# Patient Record
Sex: FEMALE | Race: WHITE | HISPANIC OR LATINO | URBAN - METROPOLITAN AREA
[De-identification: names, ages, dates, MRNs, and addresses within clinical notes are randomized per-mention and may not be internally consistent; named-entity substitution may affect disease eponyms.]

---

## 2017-08-28 ENCOUNTER — INPATIENT (INPATIENT)
Facility: HOSPITAL | Age: 22
LOS: 1 days | Discharge: HOME | DRG: 872 | End: 2017-08-30
Attending: INTERNAL MEDICINE | Admitting: INTERNAL MEDICINE
Payer: COMMERCIAL

## 2017-08-28 VITALS
RESPIRATION RATE: 18 BRPM | HEART RATE: 122 BPM | SYSTOLIC BLOOD PRESSURE: 145 MMHG | DIASTOLIC BLOOD PRESSURE: 69 MMHG | TEMPERATURE: 98 F | OXYGEN SATURATION: 97 %

## 2017-08-28 LAB
ALBUMIN SERPL ELPH-MCNC: 3.1 G/DL — LOW (ref 3.4–5)
ALP SERPL-CCNC: 33 U/L — LOW (ref 40–120)
ALT FLD-CCNC: 29 U/L — SIGNIFICANT CHANGE UP (ref 12–42)
ANION GAP SERPL CALC-SCNC: 10 MMOL/L — SIGNIFICANT CHANGE UP (ref 9–16)
APPEARANCE UR: CLEAR — SIGNIFICANT CHANGE UP
AST SERPL-CCNC: 22 U/L — SIGNIFICANT CHANGE UP (ref 15–37)
BASOPHILS NFR BLD AUTO: 1 % — SIGNIFICANT CHANGE UP (ref 0–2)
BILIRUB SERPL-MCNC: 0.5 MG/DL — SIGNIFICANT CHANGE UP (ref 0.2–1.2)
BILIRUB UR-MCNC: NEGATIVE — SIGNIFICANT CHANGE UP
BUN SERPL-MCNC: 8 MG/DL — SIGNIFICANT CHANGE UP (ref 7–23)
CALCIUM SERPL-MCNC: 8.4 MG/DL — LOW (ref 8.5–10.5)
CHLORIDE SERPL-SCNC: 103 MMOL/L — SIGNIFICANT CHANGE UP (ref 96–108)
CO2 SERPL-SCNC: 23 MMOL/L — SIGNIFICANT CHANGE UP (ref 22–31)
COLOR SPEC: YELLOW — SIGNIFICANT CHANGE UP
CREAT SERPL-MCNC: 0.7 MG/DL — SIGNIFICANT CHANGE UP (ref 0.5–1.3)
DIFF PNL FLD: (no result)
EOSINOPHIL NFR BLD AUTO: 1 % — SIGNIFICANT CHANGE UP (ref 0–6)
GLUCOSE SERPL-MCNC: 107 MG/DL — HIGH (ref 70–99)
GLUCOSE UR QL: NEGATIVE — SIGNIFICANT CHANGE UP
HCG UR QL: NEGATIVE — SIGNIFICANT CHANGE UP
HCT VFR BLD CALC: 36.3 % — SIGNIFICANT CHANGE UP (ref 34.5–45)
HGB BLD-MCNC: 12.1 G/DL — SIGNIFICANT CHANGE UP (ref 11.5–15.5)
KETONES UR-MCNC: NEGATIVE — SIGNIFICANT CHANGE UP
LACTATE SERPL-SCNC: 0.5 MMOL/L — SIGNIFICANT CHANGE UP (ref 0.4–2)
LEUKOCYTE ESTERASE UR-ACNC: NEGATIVE — SIGNIFICANT CHANGE UP
LIDOCAIN IGE QN: 50 U/L — LOW (ref 73–393)
LYMPHOCYTES # BLD AUTO: 23 % — SIGNIFICANT CHANGE UP (ref 13–44)
MCHC RBC-ENTMCNC: 29.6 PG — SIGNIFICANT CHANGE UP (ref 27–34)
MCHC RBC-ENTMCNC: 33.3 G/DL — SIGNIFICANT CHANGE UP (ref 32–36)
MCV RBC AUTO: 88.8 FL — SIGNIFICANT CHANGE UP (ref 80–100)
MONOCYTES NFR BLD AUTO: 10 % — SIGNIFICANT CHANGE UP (ref 2–14)
NEUTROPHILS NFR BLD AUTO: 42 % — LOW (ref 43–77)
NITRITE UR-MCNC: NEGATIVE — SIGNIFICANT CHANGE UP
PH UR: 6.5 — SIGNIFICANT CHANGE UP (ref 5–8)
PLATELET # BLD AUTO: 146 K/UL — LOW (ref 150–400)
POTASSIUM SERPL-MCNC: 3.5 MMOL/L — SIGNIFICANT CHANGE UP (ref 3.5–5.3)
POTASSIUM SERPL-SCNC: 3.5 MMOL/L — SIGNIFICANT CHANGE UP (ref 3.5–5.3)
PROT SERPL-MCNC: 6.4 G/DL — SIGNIFICANT CHANGE UP (ref 6.4–8.2)
PROT UR-MCNC: NEGATIVE MG/DL — SIGNIFICANT CHANGE UP
RBC # BLD: 4.09 M/UL — SIGNIFICANT CHANGE UP (ref 3.8–5.2)
RBC # FLD: 12.3 % — SIGNIFICANT CHANGE UP (ref 10.3–16.9)
SODIUM SERPL-SCNC: 136 MMOL/L — SIGNIFICANT CHANGE UP (ref 132–145)
SP GR SPEC: <=1.005 — SIGNIFICANT CHANGE UP (ref 1–1.03)
UROBILINOGEN FLD QL: 0.2 E.U./DL — SIGNIFICANT CHANGE UP
WBC # BLD: 3.8 K/UL — SIGNIFICANT CHANGE UP (ref 3.8–10.5)
WBC # FLD AUTO: 3.8 K/UL — SIGNIFICANT CHANGE UP (ref 3.8–10.5)

## 2017-08-28 PROCEDURE — 74177 CT ABD & PELVIS W/CONTRAST: CPT | Mod: 26

## 2017-08-28 PROCEDURE — 99218: CPT

## 2017-08-28 RX ORDER — MORPHINE SULFATE 50 MG/1
4 CAPSULE, EXTENDED RELEASE ORAL ONCE
Qty: 0 | Refills: 0 | Status: DISCONTINUED | OUTPATIENT
Start: 2017-08-28 | End: 2017-08-28

## 2017-08-28 RX ORDER — KETOROLAC TROMETHAMINE 30 MG/ML
30 SYRINGE (ML) INJECTION ONCE
Qty: 0 | Refills: 0 | Status: DISCONTINUED | OUTPATIENT
Start: 2017-08-28 | End: 2017-08-28

## 2017-08-28 RX ORDER — SODIUM CHLORIDE 9 MG/ML
1000 INJECTION INTRAMUSCULAR; INTRAVENOUS; SUBCUTANEOUS ONCE
Qty: 0 | Refills: 0 | Status: COMPLETED | OUTPATIENT
Start: 2017-08-28 | End: 2017-08-28

## 2017-08-28 RX ORDER — IOHEXOL 300 MG/ML
50 INJECTION, SOLUTION INTRAVENOUS ONCE
Qty: 0 | Refills: 0 | Status: COMPLETED | OUTPATIENT
Start: 2017-08-28 | End: 2017-08-28

## 2017-08-28 RX ORDER — ONDANSETRON 8 MG/1
4 TABLET, FILM COATED ORAL ONCE
Qty: 0 | Refills: 0 | Status: COMPLETED | OUTPATIENT
Start: 2017-08-28 | End: 2017-08-28

## 2017-08-28 RX ORDER — MORPHINE SULFATE 50 MG/1
2 CAPSULE, EXTENDED RELEASE ORAL EVERY 4 HOURS
Qty: 0 | Refills: 0 | Status: DISCONTINUED | OUTPATIENT
Start: 2017-08-28 | End: 2017-08-29

## 2017-08-28 RX ORDER — PIPERACILLIN AND TAZOBACTAM 4; .5 G/20ML; G/20ML
4.5 INJECTION, POWDER, LYOPHILIZED, FOR SOLUTION INTRAVENOUS ONCE
Qty: 0 | Refills: 0 | Status: COMPLETED | OUTPATIENT
Start: 2017-08-28 | End: 2017-08-28

## 2017-08-28 RX ORDER — SODIUM CHLORIDE 9 MG/ML
1000 INJECTION INTRAMUSCULAR; INTRAVENOUS; SUBCUTANEOUS
Qty: 0 | Refills: 0 | Status: DISCONTINUED | OUTPATIENT
Start: 2017-08-28 | End: 2017-08-29

## 2017-08-28 RX ADMIN — MORPHINE SULFATE 4 MILLIGRAM(S): 50 CAPSULE, EXTENDED RELEASE ORAL at 19:49

## 2017-08-28 RX ADMIN — ONDANSETRON 4 MILLIGRAM(S): 8 TABLET, FILM COATED ORAL at 18:03

## 2017-08-28 RX ADMIN — MORPHINE SULFATE 4 MILLIGRAM(S): 50 CAPSULE, EXTENDED RELEASE ORAL at 20:17

## 2017-08-28 RX ADMIN — Medication 30 MILLIGRAM(S): at 21:14

## 2017-08-28 RX ADMIN — SODIUM CHLORIDE 1000 MILLILITER(S): 9 INJECTION INTRAMUSCULAR; INTRAVENOUS; SUBCUTANEOUS at 17:00

## 2017-08-28 RX ADMIN — Medication 30 MILLIGRAM(S): at 21:47

## 2017-08-28 RX ADMIN — MORPHINE SULFATE 2 MILLIGRAM(S): 50 CAPSULE, EXTENDED RELEASE ORAL at 18:01

## 2017-08-28 RX ADMIN — MORPHINE SULFATE 4 MILLIGRAM(S): 50 CAPSULE, EXTENDED RELEASE ORAL at 23:08

## 2017-08-28 RX ADMIN — MORPHINE SULFATE 2 MILLIGRAM(S): 50 CAPSULE, EXTENDED RELEASE ORAL at 18:51

## 2017-08-28 RX ADMIN — MORPHINE SULFATE 4 MILLIGRAM(S): 50 CAPSULE, EXTENDED RELEASE ORAL at 23:23

## 2017-08-28 RX ADMIN — IOHEXOL 50 MILLILITER(S): 300 INJECTION, SOLUTION INTRAVENOUS at 18:01

## 2017-08-28 RX ADMIN — ONDANSETRON 4 MILLIGRAM(S): 8 TABLET, FILM COATED ORAL at 21:14

## 2017-08-28 RX ADMIN — SODIUM CHLORIDE 200 MILLILITER(S): 9 INJECTION INTRAMUSCULAR; INTRAVENOUS; SUBCUTANEOUS at 21:19

## 2017-08-28 RX ADMIN — PIPERACILLIN AND TAZOBACTAM 200 GRAM(S): 4; .5 INJECTION, POWDER, LYOPHILIZED, FOR SOLUTION INTRAVENOUS at 21:14

## 2017-08-28 NOTE — ED CDU PROVIDER NOTE - PROGRESS NOTE DETAILS
patient with continued abdominal pain, minimally able to tolerate POs. given bands, and pancolitis, will admit. patient amenable to admission. spoke to debra and hospitalist, accepting admission, pending a lactic. blood cultures drawn and to be sent.

## 2017-08-28 NOTE — ED PROVIDER NOTE - MEDICAL DECISION MAKING DETAILS
Hydrate w/ IV fluids. 2mg of morphine for pain. Labs, urine, CT abd/pelvis. rule out colitis, diverticulitis.

## 2017-08-28 NOTE — ED PROVIDER NOTE - PROGRESS NOTE DETAILS
patient with continued abdominal pain, minimally able to tolerate POs. given bands, and pancolitis, will admit. patient amenable to admission.

## 2017-08-28 NOTE — ED PROVIDER NOTE - GASTROINTESTINAL, MLM
abdomen soft, diffuse tenderness to b/l lower quadrants, no rebound, no guarding, no CVA tenderness.

## 2017-08-28 NOTE — ED CDU PROVIDER NOTE - OBJECTIVE STATEMENT
22 yo F w/ no pertinent PMHx presents with 3 days of watery diarrhea with diffuse lower abdominal cramping waxing/waning; unable to tolerate PO with pain and diarrhea. Pt notes last PO was a banana this morning with N/V. LMP 1 week ago. no dysuria, fever, chills, chest pain, SOB, flank pain. no recent antibiotics denies similar symptoms. pt notes gluten intolerance.

## 2017-08-28 NOTE — ED CDU PROVIDER NOTE - MEDICAL DECISION MAKING DETAILS
Hydrate w/ IV fluids. 2mg of morphine for pain. Labs, urine, CT abd/pelvis. rule out colitis, diverticulitis. admission likely if no clinical improvement.

## 2017-08-28 NOTE — ED ADULT TRIAGE NOTE - CHIEF COMPLAINT QUOTE
Pt since Friday has had abdominal and diarrhea. Pt unable to keep anything in immediately makes BM, every hour. Cold sweats and fevers. Tearful in triage.

## 2017-08-28 NOTE — ED PROVIDER NOTE - OBJECTIVE STATEMENT
22 yo F w/ no pertinent PMHx presents with 3 days of watery diarrhea with diffuse lower abdominal cramping waxing/waning; unable to tolerate PO with pain sandra last po bana nv lmp 1 wk ago no dys fc cp sob flank norefent abx denies sim gluten intolerant 22 yo F w/ no pertinent PMHx presents with 3 days of watery diarrhea with diffuse lower abdominal cramping waxing/waning; unable to tolerate PO with pain and diarrhea. Pt notes last PO was a banana this morning with N/V. LMP 1 week ago. no dysuria, fever, chills, chest pain, SOB, flank pain. no recent antibiotics denies similar symptoms. pt notes gluten intolerance.

## 2017-08-28 NOTE — ED PROVIDER NOTE - CARE PLAN
Principal Discharge DX:	Pancolitis Principal Discharge DX:	Pancolitis  Secondary Diagnosis:	Bandemia

## 2017-08-29 DIAGNOSIS — E66.9 OBESITY, UNSPECIFIED: ICD-10-CM

## 2017-08-29 DIAGNOSIS — Z90.89 ACQUIRED ABSENCE OF OTHER ORGANS: Chronic | ICD-10-CM

## 2017-08-29 DIAGNOSIS — A41.9 SEPSIS, UNSPECIFIED ORGANISM: ICD-10-CM

## 2017-08-29 DIAGNOSIS — K90.0 CELIAC DISEASE: ICD-10-CM

## 2017-08-29 DIAGNOSIS — Z29.9 ENCOUNTER FOR PROPHYLACTIC MEASURES, UNSPECIFIED: ICD-10-CM

## 2017-08-29 DIAGNOSIS — K51.00 ULCERATIVE (CHRONIC) PANCOLITIS WITHOUT COMPLICATIONS: ICD-10-CM

## 2017-08-29 LAB
ANION GAP SERPL CALC-SCNC: 10 MMOL/L — SIGNIFICANT CHANGE UP (ref 5–17)
BUN SERPL-MCNC: 7 MG/DL — SIGNIFICANT CHANGE UP (ref 7–23)
CALCIUM SERPL-MCNC: 8.1 MG/DL — LOW (ref 8.4–10.5)
CHLORIDE SERPL-SCNC: 107 MMOL/L — SIGNIFICANT CHANGE UP (ref 96–108)
CO2 SERPL-SCNC: 21 MMOL/L — LOW (ref 22–31)
CREAT SERPL-MCNC: 0.7 MG/DL — SIGNIFICANT CHANGE UP (ref 0.5–1.3)
GLUCOSE SERPL-MCNC: 79 MG/DL — SIGNIFICANT CHANGE UP (ref 70–99)
HCT VFR BLD CALC: 32.3 % — LOW (ref 34.5–45)
HGB BLD-MCNC: 10.6 G/DL — LOW (ref 11.5–15.5)
MAGNESIUM SERPL-MCNC: 2.1 MG/DL — SIGNIFICANT CHANGE UP (ref 1.6–2.6)
MCHC RBC-ENTMCNC: 29.3 PG — SIGNIFICANT CHANGE UP (ref 27–34)
MCHC RBC-ENTMCNC: 32.8 G/DL — SIGNIFICANT CHANGE UP (ref 32–36)
MCV RBC AUTO: 89.2 FL — SIGNIFICANT CHANGE UP (ref 80–100)
PHOSPHATE SERPL-MCNC: 3.4 MG/DL — SIGNIFICANT CHANGE UP (ref 2.5–4.5)
PLATELET # BLD AUTO: 124 K/UL — LOW (ref 150–400)
POTASSIUM SERPL-MCNC: 3.4 MMOL/L — LOW (ref 3.5–5.3)
POTASSIUM SERPL-SCNC: 3.4 MMOL/L — LOW (ref 3.5–5.3)
RBC # BLD: 3.62 M/UL — LOW (ref 3.8–5.2)
RBC # FLD: 12.6 % — SIGNIFICANT CHANGE UP (ref 10.3–16.9)
SODIUM SERPL-SCNC: 138 MMOL/L — SIGNIFICANT CHANGE UP (ref 135–145)
WBC # BLD: 3.1 K/UL — LOW (ref 3.8–10.5)
WBC # FLD AUTO: 3.1 K/UL — LOW (ref 3.8–10.5)

## 2017-08-29 PROCEDURE — 12345: CPT | Mod: NC

## 2017-08-29 PROCEDURE — 99223 1ST HOSP IP/OBS HIGH 75: CPT | Mod: GC

## 2017-08-29 RX ORDER — POTASSIUM CHLORIDE 20 MEQ
10 PACKET (EA) ORAL
Qty: 0 | Refills: 0 | Status: COMPLETED | OUTPATIENT
Start: 2017-08-29 | End: 2017-08-29

## 2017-08-29 RX ORDER — SODIUM CHLORIDE 9 MG/ML
1000 INJECTION INTRAMUSCULAR; INTRAVENOUS; SUBCUTANEOUS
Qty: 0 | Refills: 0 | Status: DISCONTINUED | OUTPATIENT
Start: 2017-08-29 | End: 2017-08-30

## 2017-08-29 RX ORDER — ACETAMINOPHEN 500 MG
650 TABLET ORAL EVERY 6 HOURS
Qty: 0 | Refills: 0 | Status: DISCONTINUED | OUTPATIENT
Start: 2017-08-29 | End: 2017-08-29

## 2017-08-29 RX ORDER — POTASSIUM CHLORIDE 20 MEQ
10 PACKET (EA) ORAL
Qty: 0 | Refills: 0 | Status: DISCONTINUED | OUTPATIENT
Start: 2017-08-29 | End: 2017-08-29

## 2017-08-29 RX ORDER — CIPROFLOXACIN LACTATE 400MG/40ML
400 VIAL (ML) INTRAVENOUS EVERY 12 HOURS
Qty: 0 | Refills: 0 | Status: DISCONTINUED | OUTPATIENT
Start: 2017-08-29 | End: 2017-08-30

## 2017-08-29 RX ORDER — ONDANSETRON 8 MG/1
4 TABLET, FILM COATED ORAL EVERY 6 HOURS
Qty: 0 | Refills: 0 | Status: DISCONTINUED | OUTPATIENT
Start: 2017-08-29 | End: 2017-08-30

## 2017-08-29 RX ORDER — MORPHINE SULFATE 50 MG/1
2 CAPSULE, EXTENDED RELEASE ORAL EVERY 4 HOURS
Qty: 0 | Refills: 0 | Status: DISCONTINUED | OUTPATIENT
Start: 2017-08-29 | End: 2017-08-30

## 2017-08-29 RX ORDER — POTASSIUM CHLORIDE 20 MEQ
20 PACKET (EA) ORAL ONCE
Qty: 0 | Refills: 0 | Status: DISCONTINUED | OUTPATIENT
Start: 2017-08-29 | End: 2017-08-29

## 2017-08-29 RX ORDER — MORPHINE SULFATE 50 MG/1
1 CAPSULE, EXTENDED RELEASE ORAL EVERY 4 HOURS
Qty: 0 | Refills: 0 | Status: DISCONTINUED | OUTPATIENT
Start: 2017-08-29 | End: 2017-08-29

## 2017-08-29 RX ORDER — POTASSIUM CHLORIDE 20 MEQ
40 PACKET (EA) ORAL ONCE
Qty: 0 | Refills: 0 | Status: COMPLETED | OUTPATIENT
Start: 2017-08-29 | End: 2017-08-29

## 2017-08-29 RX ORDER — MORPHINE SULFATE 50 MG/1
2 CAPSULE, EXTENDED RELEASE ORAL EVERY 6 HOURS
Qty: 0 | Refills: 0 | Status: DISCONTINUED | OUTPATIENT
Start: 2017-08-29 | End: 2017-08-29

## 2017-08-29 RX ORDER — METRONIDAZOLE 500 MG
500 TABLET ORAL EVERY 8 HOURS
Qty: 0 | Refills: 0 | Status: DISCONTINUED | OUTPATIENT
Start: 2017-08-29 | End: 2017-08-30

## 2017-08-29 RX ORDER — DIPHENHYDRAMINE HCL 50 MG
25 CAPSULE ORAL ONCE
Qty: 0 | Refills: 0 | Status: COMPLETED | OUTPATIENT
Start: 2017-08-29 | End: 2017-08-29

## 2017-08-29 RX ADMIN — MORPHINE SULFATE 2 MILLIGRAM(S): 50 CAPSULE, EXTENDED RELEASE ORAL at 17:37

## 2017-08-29 RX ADMIN — Medication 100 MILLIEQUIVALENT(S): at 17:27

## 2017-08-29 RX ADMIN — MORPHINE SULFATE 2 MILLIGRAM(S): 50 CAPSULE, EXTENDED RELEASE ORAL at 03:51

## 2017-08-29 RX ADMIN — Medication 100 MILLIEQUIVALENT(S): at 15:55

## 2017-08-29 RX ADMIN — SODIUM CHLORIDE 120 MILLILITER(S): 9 INJECTION INTRAMUSCULAR; INTRAVENOUS; SUBCUTANEOUS at 10:30

## 2017-08-29 RX ADMIN — MORPHINE SULFATE 1 MILLIGRAM(S): 50 CAPSULE, EXTENDED RELEASE ORAL at 13:21

## 2017-08-29 RX ADMIN — ONDANSETRON 4 MILLIGRAM(S): 8 TABLET, FILM COATED ORAL at 12:16

## 2017-08-29 RX ADMIN — Medication 650 MILLIGRAM(S): at 10:29

## 2017-08-29 RX ADMIN — MORPHINE SULFATE 2 MILLIGRAM(S): 50 CAPSULE, EXTENDED RELEASE ORAL at 22:04

## 2017-08-29 RX ADMIN — MORPHINE SULFATE 2 MILLIGRAM(S): 50 CAPSULE, EXTENDED RELEASE ORAL at 04:06

## 2017-08-29 RX ADMIN — Medication 200 MILLIGRAM(S): at 22:04

## 2017-08-29 RX ADMIN — MORPHINE SULFATE 2 MILLIGRAM(S): 50 CAPSULE, EXTENDED RELEASE ORAL at 17:22

## 2017-08-29 RX ADMIN — SODIUM CHLORIDE 120 MILLILITER(S): 9 INJECTION INTRAMUSCULAR; INTRAVENOUS; SUBCUTANEOUS at 02:16

## 2017-08-29 RX ADMIN — Medication 100 MILLIEQUIVALENT(S): at 14:50

## 2017-08-29 RX ADMIN — Medication 25 MILLIGRAM(S): at 02:26

## 2017-08-29 RX ADMIN — MORPHINE SULFATE 1 MILLIGRAM(S): 50 CAPSULE, EXTENDED RELEASE ORAL at 13:09

## 2017-08-29 RX ADMIN — Medication 650 MILLIGRAM(S): at 11:29

## 2017-08-29 RX ADMIN — MORPHINE SULFATE 2 MILLIGRAM(S): 50 CAPSULE, EXTENDED RELEASE ORAL at 22:19

## 2017-08-29 RX ADMIN — Medication 100 MILLIGRAM(S): at 18:56

## 2017-08-29 RX ADMIN — Medication 200 MILLIGRAM(S): at 10:35

## 2017-08-29 RX ADMIN — Medication 100 MILLIGRAM(S): at 11:57

## 2017-08-29 NOTE — DISCHARGE NOTE ADULT - CARE PLAN
Principal Discharge DX:	Pancolitis  Goal:	Mitigate nausea, abdominal pain and diarrhea  Instructions for follow-up, activity and diet:	You were seen and evaluated for diarrhea, abdominal pain and nausea. You were tested for infectious causes of your diarrhea, and we have low suspicion that you require any kind of antibiotic upon leaving the hospital. Although the cause of your diarrhea is not completely clear, it could have been due to a viral gastroenteritis, which is typically self-resolving. We did find colon inflammation on your CT scan (pancolitis), and given this finding and your symptoms, it is important that you follow up with an outpatient gastroenterologist.    If your symptoms worsen considerably after leaving the hospital, please call your doctor, or if urgent return to the hospital.  Secondary Diagnosis:	Gluten intolerance  Instructions for follow-up, activity and diet:	You have a reported history of gluten intolerance. Continue to avoid gluten at this time, but please discuss this with the gastroenterologist at your follow up appointment to insure a correct diagnosis.  Secondary Diagnosis:	Lactose intolerance in adult  Instructions for follow-up, activity and diet:	As above, please discuss with the gastroenterologist at follow up. Principal Discharge DX:	Pancolitis  Goal:	Mitigate nausea, abdominal pain and diarrhea  Instructions for follow-up, activity and diet:	You were seen and evaluated for diarrhea, abdominal pain and nausea. You were tested for infectious causes of your diarrhea, and we have low suspicion that you require any kind of antibiotic upon leaving the hospital. Although the cause of your diarrhea is not completely clear, it could have been due to a viral gastroenteritis, which is typically self-resolving. We did find colon inflammation on your CT scan (pancolitis), and given this finding and your symptoms, it is important that you follow up. Your appointment is on September 6 at 11:30 with Sandra Cartagena NP, at [address] [place] [address], [address] Floor.    If your symptoms worsen considerably after leaving the hospital, please call your doctor, or if urgent return to the hospital.  Secondary Diagnosis:	Gluten intolerance  Instructions for follow-up, activity and diet:	You have a reported history of gluten intolerance. Continue to avoid gluten at this time, but please discuss this with the gastroenterologist at your follow up appointment to insure a correct diagnosis.  Secondary Diagnosis:	Lactose intolerance in adult  Instructions for follow-up, activity and diet:	As above, please discuss with the gastroenterologist at follow up. Principal Discharge DX:	Pancolitis  Goal:	Mitigate nausea, abdominal pain and diarrhea  Instructions for follow-up, activity and diet:	You were seen and evaluated for diarrhea, abdominal pain and nausea. You were tested for infectious causes of your diarrhea, and we have low suspicion that you require any kind of antibiotic upon leaving the hospital. Although the cause of your diarrhea is not completely clear, it could have been due to a viral gastroenteritis, which is typically self-resolving. We did find colon inflammation on your CT scan (pancolitis), and given this finding and your symptoms, it is important that you follow up. Your appointment is on September 6 at 11:30 with Sandra Cartagena NP, at [address] [place] [address], [address].    If your symptoms worsen considerably after leaving the hospital, please call your doctor, or if urgent return to the hospital.  Secondary Diagnosis:	Gluten intolerance  Instructions for follow-up, activity and diet:	You have a reported history of gluten intolerance. Continue to avoid gluten at this time, but please discuss this with the gastroenterologist at your follow up appointment to insure a correct diagnosis.  Secondary Diagnosis:	Lactose intolerance in adult  Instructions for follow-up, activity and diet:	As above, please discuss with the gastroenterologist at follow up. Principal Discharge DX:	Pancolitis  Goal:	Mitigate nausea, abdominal pain and diarrhea  Instructions for follow-up, activity and diet:	You were seen and evaluated for diarrhea, abdominal pain and nausea. You were tested for infectious causes of your diarrhea, and we have low suspicion that you require any kind of antibiotic upon leaving the hospital. Although the cause of your diarrhea is not completely clear, it could have been due to a viral gastroenteritis, which is typically self-resolving. We did find colon inflammation on your CT scan (pancolitis), and given this finding and your symptoms, it is important that you follow up. Your appointment is on September 6 at 11:30 with Sandra Cartagena NP, at [address] [place] [address], 4[address].    If your symptoms worsen considerably after leaving the hospital, please call your doctor, or if urgent return to the hospital.  Secondary Diagnosis:	Gluten intolerance  Instructions for follow-up, activity and diet:	You have a reported history of gluten intolerance. Continue to avoid gluten at this time, but please discuss this with the gastroenterologist at your follow up appointment to insure a correct diagnosis.  Secondary Diagnosis:	Lactose intolerance in adult  Instructions for follow-up, activity and diet:	As above, please discuss with the gastroenterologist at follow up. Principal Discharge DX:	Pancolitis  Goal:	Mitigate nausea, abdominal pain and diarrhea  Instructions for follow-up, activity and diet:	You were seen and evaluated for diarrhea, abdominal pain and nausea. You were tested for infectious causes of your diarrhea, and we have low suspicion that you require any kind of antibiotic upon leaving the hospital. Although the cause of your diarrhea is not completely clear, it could have been due to a viral gastroenteritis, which is typically self-resolving. We did find colon inflammation on your CT scan (pancolitis), and given this finding and your symptoms, it is important that you follow up. Your appointment is on September 6 at 11:30 with Sandra Cartagena NP, at [address] [place] [address], [address].    If your symptoms worsen considerably after leaving the hospital, please call your doctor, or if urgent return to the hospital.  Secondary Diagnosis:	Gluten intolerance  Instructions for follow-up, activity and diet:	You have a reported history of gluten intolerance. Continue to avoid gluten at this time, but please discuss this with the gastroenterologist at your follow up appointment to insure a correct diagnosis.  Secondary Diagnosis:	Lactose intolerance in adult  Instructions for follow-up, activity and diet:	As above, please discuss with the gastroenterologist at follow up. Principal Discharge DX:	Pancolitis  Goal:	Mitigate nausea, abdominal pain and diarrhea  Instructions for follow-up, activity and diet:	You were seen and evaluated for diarrhea, abdominal pain and nausea. You were tested for infectious causes of your diarrhea, and we have low suspicion that you require any kind of antibiotic upon leaving the hospital. Although the cause of your diarrhea is not completely clear, it could have been due to a viral gastroenteritis, which is typically self-resolving. We did find colon inflammation on your CT scan (pancolitis), and given this finding and your symptoms, it is important that you follow up. Your appointment is on September 6 at 11:30 with Sandra Cartagena NP, at [address] [place] [address], 4[address].    If your symptoms worsen considerably after leaving the hospital, please call your doctor, or if urgent return to the hospital.  Secondary Diagnosis:	Gluten intolerance  Instructions for follow-up, activity and diet:	You have a reported history of gluten intolerance. Continue to avoid gluten at this time, but please discuss this with the gastroenterologist at your follow up appointment to insure a correct diagnosis.  Secondary Diagnosis:	Lactose intolerance in adult  Instructions for follow-up, activity and diet:	As above, please discuss with the gastroenterologist at follow up.

## 2017-08-29 NOTE — H&P ADULT - PROBLEM SELECTOR PLAN 2
-- see plan under pancoilitis Patient reports she has been told she was gluten intolerant and reports she has been complaint with diet; however patient reports she was never told she had celiac disease  -- f/u TTG and anti gliadin

## 2017-08-29 NOTE — H&P ADULT - PROBLEM SELECTOR PLAN 3
FENA: NPO for now, NS @ 125 cc/hr; replete eelctrolytes PRN    Prophylactic: IMPROVE score is zero- no need for heparin     ADMIT to F for IV abx and fluids -- see plan under pancolitis

## 2017-08-29 NOTE — H&P ADULT - PROBLEM SELECTOR PLAN 4
FENA: NPO for now, NS @ 125 cc/hr; replete electrolytes PRN    Prophylactic: IMPROVE score is zero- no need for heparin     ADMIT to F for IV abx and fluids

## 2017-08-29 NOTE — DISCHARGE NOTE ADULT - ADDITIONAL INSTRUCTIONS
Please attend your follow up gastroenterology appointment on September 6 at 11:30 with Sandra Cartagena NP, at [address], [address].    In addition, [facility] associates will call you to schedule an appointment with Dr. Ana Castro, a primary care physician.

## 2017-08-29 NOTE — H&P ADULT - PROBLEM SELECTOR PLAN 1
4 day duration of diarrhea and diffuse abdominal pain; patient met SIRS criteria upon arrival to [facility]; given zosyn and morphine and 1 liter of bolus fluid  -- will deescalate from zosyn to ciprofloxacin and metronidazole  -- f/u blood cultures, stool culture and lactoferrin   -- NS at 125 cc/hr  -- pain control with morphine   -- zofran for diarrhea  -- NPO for now but when patient clinically improved, start a BRAT/gluten free/lactose intolerant diet initially 4 day duration of diarrhea and diffuse abdominal pain; patient met SIRS criteria upon arrival to [facility]; given zosyn and morphine and 1 liter of bolus fluid  -- will deescalate from zosyn to ciprofloxacin and metronidazole  -- f/u blood cultures, stool culture and lactoferrin, C-diff  -- NS at 125 cc/hr  -- pain control with morphine   -- zofran for diarrhea  -- NPO for now but when patient clinically improved, start a BRAT/gluten free/lactose intolerant diet initially 4 day duration of diarrhea and diffuse abdominal pain; patient met SIRS criteria upon arrival to [facility] with BANDS, and HR; given zosyn and morphine and 1 liter of bolus fluid  -- will deescalate from zosyn to ciprofloxacin and metronidazole  -- f/u blood cultures, stool culture and lactoferrin, C-diff  -- NS at 125 cc/hr  -- pain control with morphine   -- zofran for diarrhea  -- NPO for now but when patient clinically improved, start a BRAT/gluten free/lactose intolerant diet initially

## 2017-08-29 NOTE — H&P ADULT - NSHPPHYSICALEXAM_GEN_ALL_CORE
.  VITAL SIGNS:  T(C): 36.9 (08-29-17 @ 00:50), Max: 36.9 (08-29-17 @ 00:50)  T(F): 98.4 (08-29-17 @ 00:50), Max: 98.4 (08-29-17 @ 00:50)  HR: 59 (08-29-17 @ 00:50) (59 - 122)  BP: 104/55 (08-29-17 @ 00:50) (104/55 - 145/69)  BP(mean): --  RR: 18 (08-29-17 @ 00:50) (18 - 18)  SpO2: 95% (08-29-17 @ 00:50) (95% - 98%)  Wt(kg): --    PHYSICAL EXAM:    Constitutional: Obese;  NAD  Head: NC/AT  Eyes: PERRL, EOMI, clear conjunctiva  ENT: no nasal discharge; uvula midline, no oropharyngeal erythema or exudates; MMM  Neck: supple; no JVD   Respiratory: CTA B/L; no W/R/R, no retractions  Cardiac: +S1/S2; RRR; no M/R/G;   Gastrointestinal: soft; tenderness to deep palpation diffusely mostly in the epigastric and suprapubic region; no rebound or guarding; +normoactive BS  Back: spine midline, no bony tenderness or step-offs; no CVAT B/L  Extremities: trace pedal edema noted; WWP, no clubbing or cyanosis;  Musculoskeletal: NROM x4; no joint swelling, tenderness or erythema  Vascular: 2+ radial and DP pulses B/L  Dermatologic: skin warm, dry and intact; no rashes, wounds, or scars  Lymphatic:+ right non-tnder submandibular LAD palpated  Neurologic: AAOx3; CNII-XII grossly intact; no focal deficits  Psychiatric: affect and characteristics of appearance, verbalizations, behaviors are appropriate

## 2017-08-29 NOTE — H&P ADULT - ATTENDING COMMENTS
Pt seen and examined at bedside on 8/29 @ 3 am    Agree with HPI, ROS as above.    VS, Labs, FH, SH, allergies, medications, imaging reviewed. Agree with physical exam as above    A/P: 22 yo female with a pmhx of gluten intolerance, lactose intolerance who presents with a 4 day duration of diarrhea and diffuse abdominal pain.    **Sepsis 2/2 Colitis  -    Plan otherwise as outlined above.... Pt seen and examined at bedside on 8/29 @ 3 am    Agree with HPI, ROS as above.    VS, Labs, FH, SH, allergies, medications, imaging reviewed. Agree with physical exam as above    A/P: 22 yo female with a pmhx of gluten intolerance, lactose intolerance who presents with a 4 day duration of diarrhea and diffuse abdominal pain.    **Sepsis 2/2 Colitis  -C/w IV cipro/flagyl as patient unable to tolerate PO at this time  -f/u stool cultures, c-diff, blood cultures  -C/w IVF  -Advance diet as tolerated    Plan otherwise as outlined above....

## 2017-08-29 NOTE — H&P ADULT - ASSESSMENT
20 yo female with a pmhx of gluten intolerance, lactose intolerance who presents with a 4 day duration of diarrhea and diffuse abdominal pain.

## 2017-08-29 NOTE — DISCHARGE NOTE ADULT - HOSPITAL COURSE
Patient is a 21F PMH gluten intolerance, lactose intolerance who presents with a 4 day duration of diarrhea and diffuse abdominal pain. Developed this sudden intermittent  intense crampy/pressure like abdominal pain that started intially in her upper belly and radiated to her lower belly region. Patient reports her BM's became more watery and frequent as well occurring >10 times the past 2 days prior to admission. Also had 1 episode of NBNB emesis. Denies any sick contacts or recent travel hx. At [facility], vitals ranged from T(F): Max: 98.4 HR 59 - 122 BP: 104/55 - 145/69 RR:18 - 18 SpO2:95% - 98%. Patient had a CT abdomen done that showed pancolitis. Patient received 1 L NS and started on zosyn. Morphine was given for pain control, and she was transferred to [facility] for admission to [facility]. IV NS and pain/nausea controlled was continued. She continued to experience diffuse crampy abdominal pain and frequent diarrhea, but her pain improved some and she tolerated a full liquid diet. VS were stable throughout, and she was deemed safe for discharge with outpatient GI follow up. Patient is a 21F PMH gluten intolerance, lactose intolerance who presents with a 4 day duration of diarrhea and diffuse abdominal pain. Developed this sudden intermittent  intense crampy/pressure like abdominal pain that started intially in her upper belly and radiated to her lower belly region. Patient reports her BM's became more watery and frequent as well occurring >10 times the past 2 days prior to admission. Also had 1 episode of NBNB emesis. Denies any sick contacts or recent travel hx. At [facility], vitals ranged from T(F): Max: 98.4 HR 59 - 122 BP: 104/55 - 145/69 RR:18 - 18 SpO2:95% - 98%. Patient had a CT abdomen done that showed pancolitis. Patient received 1 L NS and started on zosyn. Morphine was given for pain control, and she was transferred to [facility] for admission to [facility]. IV NS and pain/nausea controlled was continued. She continued to experience diffuse crampy abdominal pain and frequent diarrhea, but her pain improved markedly and she tolerated ample PO intake. Electrolytes were repleted. VS were stable throughout, and she was deemed safe for discharge with outpatient GI follow up.

## 2017-08-29 NOTE — DISCHARGE NOTE ADULT - PATIENT PORTAL LINK FT
“You can access the Follow[facility] Patient Portal, offered by [facility], by registering with the following website: [email or web address]”

## 2017-08-29 NOTE — H&P ADULT - NSHPSOCIALHISTORY_GEN_ALL_CORE
patient reports marijuana use; drinks a glass of wine daily; smokes cigarettes socially; lives alone; just moved from [place] 3 months ago

## 2017-08-29 NOTE — DISCHARGE NOTE ADULT - CARE PROVIDER_API CALL
Ana Castro), Internal Medicine  [address]  [address] [place] [address]  Phone: [phone]  Fax: [phone]    Sandra Cartagena (NP; RN), NP in Family Health  [address]  [address] [place] [address]  Phone: [phone]  Fax: [phone]

## 2017-08-29 NOTE — H&P ADULT - NSHPLABSRESULTS_GEN_ALL_CORE
.  LABS:                         12.1   3.8   )-----------( 146      ( 28 Aug 2017 17:00 )             36.3     08-28    136  |  103  |  8   ----------------------------<  107<H>  3.5   |  23  |  0.70    Ca    8.4<L>      28 Aug 2017 17:00    TPro  6.4  /  Alb  3.1<L>  /  TBili  0.5  /  DBili  x   /  AST  22  /  ALT  29  /  AlkPhos  33<L>  08-28      Urinalysis Basic - ( 28 Aug 2017 17:56 )    Color: Yellow / Appearance: Clear / SG: <=1.005 / pH: x  Gluc: x / Ketone: NEGATIVE  / Bili: NEGATIVE / Urobili: 0.2 E.U./dL   Blood: x / Protein: NEGATIVE mg/dL / Nitrite: NEGATIVE   Leuk Esterase: NEGATIVE / RBC: < 5 /HPF / WBC < 5 /HPF   Sq Epi: x / Non Sq Epi: Rare /HPF / Bacteria: x            Lactate, Blood: 0.5 mmoL/L (08-28 @ 23:01)      RADIOLOGY, EKG & ADDITIONAL TESTS: Reviewed.

## 2017-08-29 NOTE — PROGRESS NOTE ADULT - SUBJECTIVE AND OBJECTIVE BOX
CC: Nausea vomiting and diarrhea. Also diffuse abdominal pain.  No other complaints  Rest of ROS negative    Vital Signs Last 24 Hrs  T(C): 36.9 (29 Aug 2017 15:46), Max: 36.9 (29 Aug 2017 00:50)  T(F): 98.4 (29 Aug 2017 15:46), Max: 98.4 (29 Aug 2017 00:50)  HR: 63 (29 Aug 2017 15:46) (59 - 73)  BP: 103/67 (29 Aug 2017 15:46) (103/67 - 128/58)  BP(mean): --  RR: 20 (29 Aug 2017 15:46) (18 - 20)  SpO2: 98% (29 Aug 2017 15:46) (95% - 99%)    PHYSICAL EXAMINATION  * General: Not in acute distress. Awake and alert. Lying comfortably in bed.  * Head: Normocephalic, atraumatic.  * HEENT: ears no discharge, eyes PERRLA, nose no discharge, throat no exudates, normal tonsils.  * Neck: no JVD, supple.  * Lungs: Clear to auscultation, no rales, no wheezes.  * Cardio: Regular rate and rhythm, no murmurs, no rubs, no gallops. Good peripheral pulses.  * Abdomen: Soft, non-tender, non-distended, tympanic to percussion, no rebound, no guarding, no rigidity. Bowel sounds present. No suprapubic or CVA tenderness.  * : Deferred.  * Extremities: Acyanotic, no edema.  * Skin: Warm and dry.  * Neuro: Alert and oriented x 3. No focal deficits. Motor strength is 5/5 throughout. Sensation intact. Cranial nerves II-XII grossly intact.                           10.6   3.1   )-----------( 124      ( 29 Aug 2017 05:36 )             32.3   08-29    138  |  107  |  7   ----------------------------<  79  3.4<L>   |  21<L>  |  0.70    Ca    8.1<L>      29 Aug 2017 05:36  Phos  3.4     08-29  Mg     2.1     08-29    TPro  6.4  /  Alb  3.1<L>  /  TBili  0.5  /  DBili  x   /  AST  22  /  ALT  29  /  AlkPhos  33<L>  08-28      MEDICATIONS  (STANDING):  sodium chloride 0.9%. 1000 milliLiter(s) (120 mL/Hr) IV Continuous <Continuous>  ciprofloxacin   IVPB 400 milliGRAM(s) IV Intermittent every 12 hours  metroNIDAZOLE  IVPB 500 milliGRAM(s) IV Intermittent every 8 hours    MEDICATIONS  (PRN):  ondansetron Injectable 4 milliGRAM(s) IV Push every 6 hours PRN Nausea  morphine  - Injectable 2 milliGRAM(s) IV Push every 4 hours PRN Severe Pain (7 - 10)

## 2017-08-29 NOTE — DISCHARGE NOTE ADULT - MEDICATION SUMMARY - MEDICATIONS TO TAKE
I will START or STAY ON the medications listed below when I get home from the hospital:  None I will START or STAY ON the medications listed below when I get home from the hospital:    Flagyl 500 mg oral tablet  -- 1 tab(s) by mouth every 8 hours  -- Do not drink alcoholic beverages when taking this medication.  Finish all this medication unless otherwise directed by prescriber.  May discolor urine or feces.    -- Indication: For Colitis    Cipro 500 mg oral tablet  -- 1 tab(s) by mouth 2 times a day  -- Avoid prolonged or excessive exposure to direct and/or artificial sunlight while taking this medication.  Check with your doctor before becoming pregnant.  Do not take dairy products, antacids, or iron preparations within one hour of this medication.  Finish all this medication unless otherwise directed by prescriber.  Medication should be taken with plenty of water.    -- Indication: For Colitis

## 2017-08-29 NOTE — H&P ADULT - HISTORY OF PRESENT ILLNESS
Patient is a 20 yo female with a pmhx of gliadin intolerance. Patient is a 22 yo female with a pmhx of gluten intolerance, lactose intolerance who presents with a 4 day duration of diarrhea and diffuse abdominal pain. Patient reports this past thursday she had 5 solid BM;'s that was abnormal in regards to her bowel habits (2-3 BM's a day at baseline). Patient on friday then developed this sudden intermittent  intense crampy/pressure like abdominal pain that started intially in her upper belly and radiated to her lower belly region. Patient her BM's became more watery and frequent as well occurring >10 times the past 2 days. Patient reports the stool is loose watery, Non-Volumious and consisted of a barger color with a malodorous oder. Patient tried to take advil for her abdominal pain although the pain did not improve. Associated symptoms include 1 episode of NBNB emesis that occurred yesterday, nausea and loss of appetite ROS is postive for chills, subjective fevers, lightheadedness muscle aches and overall maliase. Patient denies headaches CP, SOB, urinary frequency/urgency/dysuria. Patient works as a  but denies any new food exposures. Denies any sick contacts or recent travel hx.     At [facility], vitals ranged from T(F): Max: 98.4 HR 59 - 122 BP: 104/55 - 145/69 RR:18 - 18 SpO2:95% - 98%    Patient had a CT abdomen done that showed pancoilitis. Patient received 1 L NS and started on zosyn. Morphine was also given for pain control. Patient is a 22 yo female with a pmhx of gluten intolerance, lactose intolerance who presents with a 4 day duration of diarrhea and diffuse abdominal pain. Patient reports this past thursday she had 5 solid BM;'s that was abnormal in regards to her bowel habits (2-3 BM's a day at baseline). Patient on friday then developed this sudden intermittent  intense crampy/pressure like abdominal pain that started intially in her upper belly and radiated to her lower belly region. Patient reports her BM's became more watery and frequent as well occurring >10 times the past 2 days. Patient reports the stool is loose watery, Non-Volumious and consisted of a "barger color" with a malodorous odor. Patient tried to take advil for her abdominal pain although the pain did not improve. Associated symptoms include 1 episode of NBNB emesis that occurred yesterday, nausea and loss of appetite ROS is positive for chills, subjective fevers, lightheadedness muscle aches and overall malaise Patient denies headaches CP, SOB, urinary frequency/urgency/dysuria. Patient works as a  but denies any new food exposures. Denies any sick contacts or recent travel hx.     At [facility], vitals ranged from T(F): Max: 98.4 HR 59 - 122 BP: 104/55 - 145/69 RR:18 - 18 SpO2:95% - 98%    Patient had a CT abdomen done that showed pancoilitis. Patient received 1 L NS and started on zosyn. Morphine was also given for pain control.

## 2017-08-29 NOTE — DISCHARGE NOTE ADULT - PLAN OF CARE
Mitigate nausea, abdominal pain and diarrhea You were seen and evaluated for diarrhea, abdominal pain and nausea. You were tested for infectious causes of your diarrhea, and we have low suspicion that you require any kind of antibiotic upon leaving the hospital. Although the cause of your diarrhea is not completely clear, it could have been due to a viral gastroenteritis, which is typically self-resolving. We did find colon inflammation on your CT scan (pancolitis), and given this finding and your symptoms, it is important that you follow up with an outpatient gastroenterologist.    If your symptoms worsen considerably after leaving the hospital, please call your doctor, or if urgent return to the hospital. You have a reported history of gluten intolerance. Continue to avoid gluten at this time, but please discuss this with the gastroenterologist at your follow up appointment to insure a correct diagnosis. As above, please discuss with the gastroenterologist at follow up. You were seen and evaluated for diarrhea, abdominal pain and nausea. You were tested for infectious causes of your diarrhea, and we have low suspicion that you require any kind of antibiotic upon leaving the hospital. Although the cause of your diarrhea is not completely clear, it could have been due to a viral gastroenteritis, which is typically self-resolving. We did find colon inflammation on your CT scan (pancolitis), and given this finding and your symptoms, it is important that you follow up. Your appointment is on September 6 at 11:30 with Sandra Cartagena NP, at [address] [place] [address], [address].    If your symptoms worsen considerably after leaving the hospital, please call your doctor, or if urgent return to the hospital.

## 2017-08-29 NOTE — PROGRESS NOTE ADULT - PROBLEM SELECTOR PLAN 1
Sepsis (POA) secondary to pan colitis.  * c/w cipro and flagyl iv  * full liquid diet. advance as tolerated.  * Zofran prn for nausea  * morphine prn for pain

## 2017-08-30 VITALS
SYSTOLIC BLOOD PRESSURE: 110 MMHG | TEMPERATURE: 98 F | OXYGEN SATURATION: 98 % | RESPIRATION RATE: 15 BRPM | HEART RATE: 65 BPM | DIASTOLIC BLOOD PRESSURE: 68 MMHG

## 2017-08-30 LAB
ANION GAP SERPL CALC-SCNC: 11 MMOL/L — SIGNIFICANT CHANGE UP (ref 5–17)
BUN SERPL-MCNC: 2 MG/DL — LOW (ref 7–23)
CALCIUM SERPL-MCNC: 8.2 MG/DL — LOW (ref 8.4–10.5)
CHLORIDE SERPL-SCNC: 108 MMOL/L — SIGNIFICANT CHANGE UP (ref 96–108)
CO2 SERPL-SCNC: 21 MMOL/L — LOW (ref 22–31)
CREAT SERPL-MCNC: 0.5 MG/DL — SIGNIFICANT CHANGE UP (ref 0.5–1.3)
GLIADIN PEPTIDE IGA SER-ACNC: <5 UNITS — SIGNIFICANT CHANGE UP
GLIADIN PEPTIDE IGA SER-ACNC: NEGATIVE — SIGNIFICANT CHANGE UP
GLIADIN PEPTIDE IGG SER-ACNC: <5 UNITS — SIGNIFICANT CHANGE UP
GLIADIN PEPTIDE IGG SER-ACNC: NEGATIVE — SIGNIFICANT CHANGE UP
GLUCOSE SERPL-MCNC: 89 MG/DL — SIGNIFICANT CHANGE UP (ref 70–99)
HCT VFR BLD CALC: 31.3 % — LOW (ref 34.5–45)
HGB BLD-MCNC: 10.3 G/DL — LOW (ref 11.5–15.5)
MAGNESIUM SERPL-MCNC: 1.8 MG/DL — SIGNIFICANT CHANGE UP (ref 1.6–2.6)
MCHC RBC-ENTMCNC: 28.9 PG — SIGNIFICANT CHANGE UP (ref 27–34)
MCHC RBC-ENTMCNC: 32.9 G/DL — SIGNIFICANT CHANGE UP (ref 32–36)
MCV RBC AUTO: 87.9 FL — SIGNIFICANT CHANGE UP (ref 80–100)
PLATELET # BLD AUTO: 138 K/UL — LOW (ref 150–400)
POTASSIUM SERPL-MCNC: 3 MMOL/L — LOW (ref 3.5–5.3)
POTASSIUM SERPL-SCNC: 3 MMOL/L — LOW (ref 3.5–5.3)
RBC # BLD: 3.56 M/UL — LOW (ref 3.8–5.2)
RBC # FLD: 12.4 % — SIGNIFICANT CHANGE UP (ref 10.3–16.9)
SODIUM SERPL-SCNC: 140 MMOL/L — SIGNIFICANT CHANGE UP (ref 135–145)
TTG IGA SER-ACNC: <5 UNITS — SIGNIFICANT CHANGE UP
TTG IGA SER-ACNC: NEGATIVE — SIGNIFICANT CHANGE UP
TTG IGG SER IA-ACNC: NEGATIVE — SIGNIFICANT CHANGE UP
TTG IGG SER-ACNC: <5 UNITS — SIGNIFICANT CHANGE UP
WBC # BLD: 2.8 K/UL — LOW (ref 3.8–10.5)
WBC # FLD AUTO: 2.8 K/UL — LOW (ref 3.8–10.5)

## 2017-08-30 PROCEDURE — 87046 STOOL CULTR AEROBIC BACT EA: CPT

## 2017-08-30 PROCEDURE — 99239 HOSP IP/OBS DSCHRG MGMT >30: CPT

## 2017-08-30 PROCEDURE — 74177 CT ABD & PELVIS W/CONTRAST: CPT

## 2017-08-30 PROCEDURE — 85025 COMPLETE CBC W/AUTO DIFF WBC: CPT

## 2017-08-30 PROCEDURE — 87324 CLOSTRIDIUM AG IA: CPT

## 2017-08-30 PROCEDURE — 96375 TX/PRO/DX INJ NEW DRUG ADDON: CPT | Mod: XU

## 2017-08-30 PROCEDURE — 81025 URINE PREGNANCY TEST: CPT

## 2017-08-30 PROCEDURE — 85027 COMPLETE CBC AUTOMATED: CPT

## 2017-08-30 PROCEDURE — 96374 THER/PROPH/DIAG INJ IV PUSH: CPT | Mod: XU

## 2017-08-30 PROCEDURE — 86364 TISS TRNSGLTMNASE EA IG CLAS: CPT

## 2017-08-30 PROCEDURE — 87184 SC STD DISK METHOD PER PLATE: CPT

## 2017-08-30 PROCEDURE — G0378: CPT

## 2017-08-30 PROCEDURE — 83690 ASSAY OF LIPASE: CPT

## 2017-08-30 PROCEDURE — 83735 ASSAY OF MAGNESIUM: CPT

## 2017-08-30 PROCEDURE — 83605 ASSAY OF LACTIC ACID: CPT

## 2017-08-30 PROCEDURE — 87040 BLOOD CULTURE FOR BACTERIA: CPT

## 2017-08-30 PROCEDURE — 36415 COLL VENOUS BLD VENIPUNCTURE: CPT

## 2017-08-30 PROCEDURE — 86258 DGP ANTIBODY EACH IG CLASS: CPT

## 2017-08-30 PROCEDURE — 80048 BASIC METABOLIC PNL TOTAL CA: CPT

## 2017-08-30 PROCEDURE — 84100 ASSAY OF PHOSPHORUS: CPT

## 2017-08-30 PROCEDURE — 80053 COMPREHEN METABOLIC PANEL: CPT

## 2017-08-30 PROCEDURE — 81001 URINALYSIS AUTO W/SCOPE: CPT

## 2017-08-30 PROCEDURE — 87045 FECES CULTURE AEROBIC BACT: CPT

## 2017-08-30 PROCEDURE — 99285 EMERGENCY DEPT VISIT HI MDM: CPT | Mod: 25

## 2017-08-30 PROCEDURE — 96376 TX/PRO/DX INJ SAME DRUG ADON: CPT

## 2017-08-30 RX ORDER — POTASSIUM CHLORIDE 20 MEQ
40 PACKET (EA) ORAL ONCE
Qty: 0 | Refills: 0 | Status: COMPLETED | OUTPATIENT
Start: 2017-08-30 | End: 2017-08-30

## 2017-08-30 RX ORDER — CIPROFLOXACIN LACTATE 400MG/40ML
500 VIAL (ML) INTRAVENOUS EVERY 12 HOURS
Qty: 0 | Refills: 0 | Status: DISCONTINUED | OUTPATIENT
Start: 2017-08-30 | End: 2017-08-30

## 2017-08-30 RX ORDER — POTASSIUM CHLORIDE 20 MEQ
40 PACKET (EA) ORAL EVERY 4 HOURS
Qty: 0 | Refills: 0 | Status: DISCONTINUED | OUTPATIENT
Start: 2017-08-30 | End: 2017-08-30

## 2017-08-30 RX ORDER — POTASSIUM CHLORIDE 20 MEQ
10 PACKET (EA) ORAL
Qty: 0 | Refills: 0 | Status: COMPLETED | OUTPATIENT
Start: 2017-08-30 | End: 2017-08-30

## 2017-08-30 RX ORDER — MAGNESIUM SULFATE 500 MG/ML
1 VIAL (ML) INJECTION ONCE
Qty: 0 | Refills: 0 | Status: COMPLETED | OUTPATIENT
Start: 2017-08-30 | End: 2017-08-30

## 2017-08-30 RX ORDER — METRONIDAZOLE 500 MG
500 TABLET ORAL EVERY 8 HOURS
Qty: 0 | Refills: 0 | Status: DISCONTINUED | OUTPATIENT
Start: 2017-08-30 | End: 2017-08-30

## 2017-08-30 RX ORDER — ACETAMINOPHEN WITH CODEINE 300MG-30MG
1 TABLET ORAL EVERY 4 HOURS
Qty: 0 | Refills: 0 | Status: DISCONTINUED | OUTPATIENT
Start: 2017-08-30 | End: 2017-08-30

## 2017-08-30 RX ADMIN — Medication 40 MILLIEQUIVALENT(S): at 10:46

## 2017-08-30 RX ADMIN — Medication 100 MILLIEQUIVALENT(S): at 16:14

## 2017-08-30 RX ADMIN — SODIUM CHLORIDE 120 MILLILITER(S): 9 INJECTION INTRAMUSCULAR; INTRAVENOUS; SUBCUTANEOUS at 00:11

## 2017-08-30 RX ADMIN — Medication 100 GRAM(S): at 13:03

## 2017-08-30 RX ADMIN — SODIUM CHLORIDE 120 MILLILITER(S): 9 INJECTION INTRAMUSCULAR; INTRAVENOUS; SUBCUTANEOUS at 08:48

## 2017-08-30 RX ADMIN — MORPHINE SULFATE 2 MILLIGRAM(S): 50 CAPSULE, EXTENDED RELEASE ORAL at 03:21

## 2017-08-30 RX ADMIN — Medication 1 TABLET(S): at 11:44

## 2017-08-30 RX ADMIN — Medication 40 MILLIEQUIVALENT(S): at 14:05

## 2017-08-30 RX ADMIN — Medication 1 TABLET(S): at 12:44

## 2017-08-30 RX ADMIN — Medication 100 MILLIEQUIVALENT(S): at 08:50

## 2017-08-30 RX ADMIN — MORPHINE SULFATE 2 MILLIGRAM(S): 50 CAPSULE, EXTENDED RELEASE ORAL at 03:06

## 2017-08-30 RX ADMIN — Medication 100 MILLIEQUIVALENT(S): at 11:39

## 2017-08-30 RX ADMIN — Medication 500 MILLIGRAM(S): at 10:46

## 2017-08-30 RX ADMIN — Medication 100 MILLIGRAM(S): at 01:25

## 2017-08-30 NOTE — PROGRESS NOTE ADULT - SUBJECTIVE AND OBJECTIVE BOX
CC: Improved N/V and diarrhea. Diarrhea still present but less frequent.   No more N/V.  Rest of ROS negative.    Vital Signs Last 24 Hrs  T(C): 36.6 (30 Aug 2017 09:03), Max: 36.9 (29 Aug 2017 15:46)  T(F): 97.9 (30 Aug 2017 09:03), Max: 98.4 (29 Aug 2017 15:46)  HR: 63 (30 Aug 2017 09:03) (63 - 77)  BP: 120/74 (30 Aug 2017 09:03) (103/67 - 121/75)  BP(mean): --  RR: 16 (30 Aug 2017 09:03) (16 - 20)  SpO2: 97% (30 Aug 2017 09:03) (97% - 100%)    PHYSICAL EXAMINATION  * General: Not in acute distress. Awake and alert. Lying comfortably in bed.  * Head: Normocephalic, atraumatic.  * HEENT: ears no discharge, eyes PERRLA, nose no discharge, throat no exudates, normal tonsils.  * Neck: no JVD, supple.  * Lungs: Clear to auscultation, no rales, no wheezes.  * Cardio: Regular rate and rhythm, no murmurs, no rubs, no gallops. Good peripheral pulses.  * Abdomen: Soft, non-tender, non-distended, tympanic to percussion, no rebound, no guarding, no rigidity. Bowel sounds present. No suprapubic or CVA tenderness.  * : Deferred.  * Extremities: Acyanotic, no edema.  * Skin: Warm and dry.  * Neuro: Alert and oriented x 3. No focal deficits. Motor strength is 5/5 throughout. Sensation intact. Cranial nerves II-XII grossly intact.                           10.3   2.8   )-----------( 138      ( 30 Aug 2017 06:20 )             31.3     08-30    140  |  108  |  2<L>  ----------------------------<  89  3.0<L>   |  21<L>  |  0.50    Ca    8.2<L>      30 Aug 2017 06:20  Phos  3.4     08-29  Mg     1.8     08-30    TPro  6.4  /  Alb  3.1<L>  /  TBili  0.5  /  DBili  x   /  AST  22  /  ALT  29  /  AlkPhos  33<L>  08-28    MEDICATIONS  (STANDING):  sodium chloride 0.9%. 1000 milliLiter(s) (120 mL/Hr) IV Continuous <Continuous>  potassium chloride  10 mEq/100 mL IVPB 10 milliEquivalent(s) IV Intermittent every 1 hour  ciprofloxacin     Tablet 500 milliGRAM(s) Oral every 12 hours  metroNIDAZOLE    Tablet 500 milliGRAM(s) Oral every 8 hours  potassium chloride   Powder 40 milliEquivalent(s) Oral once    MEDICATIONS  (PRN):  ondansetron Injectable 4 milliGRAM(s) IV Push every 6 hours PRN Nausea  acetaminophen 300 mG/codeine 30 mG 1 Tablet(s) Oral every 4 hours PRN Severe Pain (7 - 10)

## 2017-08-30 NOTE — PROGRESS NOTE ADULT - PROBLEM SELECTOR PLAN 1
Sepsis (POA) secondary to pan colitis.-now resolved.   * Switch cipro and flagyl to PO x total 5 days.  * Advance diet to soft.   * Outpatient follow up with PCP.

## 2017-08-31 RX ORDER — METRONIDAZOLE 500 MG
1 TABLET ORAL
Qty: 12 | Refills: 0 | OUTPATIENT
Start: 2017-08-31 | End: 2017-09-04

## 2017-08-31 RX ORDER — MOXIFLOXACIN HYDROCHLORIDE TABLETS, 400 MG 400 MG/1
1 TABLET, FILM COATED ORAL
Qty: 8 | Refills: 0 | OUTPATIENT
Start: 2017-08-31 | End: 2017-09-04

## 2017-09-01 DIAGNOSIS — E87.6 HYPOKALEMIA: ICD-10-CM

## 2017-09-01 DIAGNOSIS — F12.10 CANNABIS ABUSE, UNCOMPLICATED: ICD-10-CM

## 2017-09-01 DIAGNOSIS — Z83.3 FAMILY HISTORY OF DIABETES MELLITUS: ICD-10-CM

## 2017-09-01 DIAGNOSIS — A41.9 SEPSIS, UNSPECIFIED ORGANISM: ICD-10-CM

## 2017-09-01 DIAGNOSIS — R10.9 UNSPECIFIED ABDOMINAL PAIN: ICD-10-CM

## 2017-09-01 DIAGNOSIS — A08.4 VIRAL INTESTINAL INFECTION, UNSPECIFIED: ICD-10-CM

## 2017-09-01 DIAGNOSIS — K51.00 ULCERATIVE (CHRONIC) PANCOLITIS WITHOUT COMPLICATIONS: ICD-10-CM

## 2017-09-01 DIAGNOSIS — K90.41 NON-CELIAC GLUTEN SENSITIVITY: ICD-10-CM

## 2017-09-01 DIAGNOSIS — D63.8 ANEMIA IN OTHER CHRONIC DISEASES CLASSIFIED ELSEWHERE: ICD-10-CM

## 2017-09-01 DIAGNOSIS — E73.9 LACTOSE INTOLERANCE, UNSPECIFIED: ICD-10-CM

## 2017-09-01 DIAGNOSIS — D69.6 THROMBOCYTOPENIA, UNSPECIFIED: ICD-10-CM

## 2017-09-01 LAB
-  CIPROFLOXACIN: SIGNIFICANT CHANGE UP
-  ERYTHROMYCIN: SIGNIFICANT CHANGE UP
-  TETRACYCLINE: SIGNIFICANT CHANGE UP
CULTURE RESULTS: SIGNIFICANT CHANGE UP
Lab: SIGNIFICANT CHANGE UP
METHOD TYPE: SIGNIFICANT CHANGE UP
METHOD TYPE: SIGNIFICANT CHANGE UP
ORGANISM # SPEC MICROSCOPIC CNT: SIGNIFICANT CHANGE UP
SPECIMEN SOURCE: SIGNIFICANT CHANGE UP

## 2017-09-03 LAB
CULTURE RESULTS: SIGNIFICANT CHANGE UP
CULTURE RESULTS: SIGNIFICANT CHANGE UP
SPECIMEN SOURCE: SIGNIFICANT CHANGE UP
SPECIMEN SOURCE: SIGNIFICANT CHANGE UP

## 2019-02-12 ENCOUNTER — APPOINTMENT (RX ONLY)
Dept: URBAN - METROPOLITAN AREA CLINIC 45 | Facility: CLINIC | Age: 24
Setting detail: DERMATOLOGY
End: 2019-02-12

## 2019-02-12 DIAGNOSIS — L72.0 EPIDERMAL CYST: ICD-10-CM | Status: INADEQUATELY CONTROLLED

## 2019-02-12 PROBLEM — F32.9 MAJOR DEPRESSIVE DISORDER, SINGLE EPISODE, UNSPECIFIED: Status: ACTIVE | Noted: 2019-02-12

## 2019-02-12 PROBLEM — F41.9 ANXIETY DISORDER, UNSPECIFIED: Status: ACTIVE | Noted: 2019-02-12

## 2019-02-12 PROCEDURE — 99202 OFFICE O/P NEW SF 15 MIN: CPT

## 2019-02-12 PROCEDURE — ? COUNSELING

## 2019-02-12 PROCEDURE — ? TREATMENT REGIMEN

## 2019-02-12 PROCEDURE — ? PRESCRIPTION

## 2019-02-12 RX ORDER — DOXYCYCLINE HYCLATE 120 MG/1
TABLET, DELAYED RELEASE ORAL
Qty: 30 | Refills: 0 | Status: ERX | COMMUNITY
Start: 2019-02-12

## 2019-02-12 RX ADMIN — DOXYCYCLINE HYCLATE: 120 TABLET, DELAYED RELEASE ORAL at 17:15

## 2019-02-12 ASSESSMENT — LOCATION SIMPLE DESCRIPTION DERM: LOCATION SIMPLE: RIGHT FOREHEAD

## 2019-02-12 ASSESSMENT — LOCATION ZONE DERM: LOCATION ZONE: FACE

## 2019-02-12 ASSESSMENT — LOCATION DETAILED DESCRIPTION DERM: LOCATION DETAILED: RIGHT FOREHEAD

## 2019-02-12 NOTE — PROCEDURE: TREATMENT REGIMEN
Initiate Treatment: Doryx 120mg to take 1 tab daily with a full meal, same time each day, avoid dairy products 2 hours before and after
Detail Level: Detailed
Otc Regimen: Recommended polysporin

## 2022-03-22 NOTE — PROGRESS NOTE ADULT - PROBLEM SELECTOR PLAN 2
as above For information on Fall & Injury Prevention, visit: [email or web address] OR  [email or web address] OR  https://www.cdc.gov/steadi/patient.html

## 2024-11-14 NOTE — CHART NOTE - NSCHARTNOTEFT_GEN_A_CORE
Got a phone call today at ~4pm from microbiology.  Stool cx positive for campylobacter Jejuni  I spoke with patient over the phone to give new.   Patient to c/w her meds of cipro and flagyl for total of 5 days.  Will call her back tomorrow regarding sensitivities.  Educated about campylobacter and instructed not to cook (patient is a ) until all symptoms (diarrhea) resolved. PAST SURGICAL HISTORY:  No significant past surgical history